# Patient Record
Sex: FEMALE | URBAN - METROPOLITAN AREA
[De-identification: names, ages, dates, MRNs, and addresses within clinical notes are randomized per-mention and may not be internally consistent; named-entity substitution may affect disease eponyms.]

---
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
## 2023-06-16 NOTE — HPI: MEDICATION (ISOTRETINOIN)
How Severe Is It?: mild
Is This A New Presentation, Or A Follow-Up?: Follow Up Isotretinoin
Display Cumulative Dose In The Note?: Yes
Polish

## 2025-04-02 NOTE — PROCEDURE: TREATMENT REGIMEN
[At Term] : at term
[[place] : [place]
[Normal Vaginal Route] : by normal vaginal route
[None] : there were no delivery complications
Initiate Treatment: Fabior foam Apply to face 2-3 x a week at night
Detail Level: Zone